# Patient Record
Sex: FEMALE | Race: BLACK OR AFRICAN AMERICAN | Employment: FULL TIME | URBAN - METROPOLITAN AREA
[De-identification: names, ages, dates, MRNs, and addresses within clinical notes are randomized per-mention and may not be internally consistent; named-entity substitution may affect disease eponyms.]

---

## 2024-03-02 ENCOUNTER — APPOINTMENT (OUTPATIENT)
Dept: GENERAL RADIOLOGY | Age: 24
End: 2024-03-02
Attending: EMERGENCY MEDICINE
Payer: MEDICAID

## 2024-03-02 ENCOUNTER — HOSPITAL ENCOUNTER (EMERGENCY)
Age: 24
Discharge: HOME | End: 2024-03-02
Attending: EMERGENCY MEDICINE
Payer: MEDICAID

## 2024-03-02 VITALS
TEMPERATURE: 99 F | HEART RATE: 93 BPM | HEIGHT: 63 IN | WEIGHT: 120 LBS | DIASTOLIC BLOOD PRESSURE: 78 MMHG | RESPIRATION RATE: 20 BRPM | OXYGEN SATURATION: 97 % | BODY MASS INDEX: 21.26 KG/M2 | SYSTOLIC BLOOD PRESSURE: 123 MMHG

## 2024-03-02 DIAGNOSIS — J10.1 INFLUENZA B: Primary | ICD-10-CM

## 2024-03-02 LAB
POCT INFLUENZA A: NEGATIVE
POCT INFLUENZA B: POSITIVE
SARS-COV-2 RNA RESP QL NAA+PROBE: NOT DETECTED

## 2024-03-02 PROCEDURE — 87502 INFLUENZA DNA AMP PROBE: CPT | Performed by: EMERGENCY MEDICINE

## 2024-03-02 PROCEDURE — 99284 EMERGENCY DEPT VISIT MOD MDM: CPT

## 2024-03-02 PROCEDURE — 71045 X-RAY EXAM CHEST 1 VIEW: CPT | Performed by: EMERGENCY MEDICINE

## 2024-03-02 PROCEDURE — 87502 INFLUENZA DNA AMP PROBE: CPT

## 2024-03-02 RX ORDER — IBUPROFEN 600 MG/1
600 TABLET ORAL EVERY 8 HOURS PRN
Qty: 30 TABLET | Refills: 0 | Status: SHIPPED | OUTPATIENT
Start: 2024-03-02 | End: 2024-03-12

## 2024-03-02 RX ORDER — IBUPROFEN 600 MG/1
600 TABLET ORAL ONCE
Status: COMPLETED | OUTPATIENT
Start: 2024-03-02 | End: 2024-03-02

## 2024-03-03 NOTE — ED PROVIDER NOTES
Patient Seen in: [facility] In [place]      History     Chief Complaint   Patient presents with    Cough/URI     Stated Complaint: since wednesday upper back pain, coughing, runny nose, hasnt taken flu or covid*    Subjective:   HPI    Patient is a 23-year-old female who presents Wednesday has been having upper respiratory symptoms with a productive cough low-grade fever and bodyaches.  Did not take any medications for this.  Is concerned may have COVID or influenza.  Does have a history of asthma and states her chest has felt little more tight than usual.  No other complaints.    Objective:   Past Medical History:   Diagnosis Date    Asthma (HCC)               History reviewed. No pertinent surgical history.             Social History     Socioeconomic History    Marital status: Single   Tobacco Use    Smoking status: Former     Types: Cigarettes    Smokeless tobacco: Never   Vaping Use    Vaping Use: Never used   Substance and Sexual Activity    Alcohol use: Yes     Alcohol/week: 0.0 standard drinks of alcohol    Drug use: Yes     Frequency: 1.0 times per week     Types: Cannabis    Sexual activity: Yes     Partners: Male              Review of Systems    Positive for stated complaint: since wednesday upper back pain, coughing, runny nose, hasnt taken flu or covid*  Other systems are as noted in HPI.  Constitutional and vital signs reviewed.      All other systems reviewed and negative except as noted above.    Physical Exam     ED Triage Vitals   BP 03/02/24 1843 123/78   Pulse 03/02/24 1843 93   Resp 03/02/24 1843 20   Temp 03/02/24 1842 98.7 °F (37.1 °C)   Temp src 03/02/24 1842 Temporal   SpO2 03/02/24 1843 97 %   O2 Device 03/02/24 1843 None (Room air)       Current:/78   Pulse 93   Temp 98.7 °F (37.1 °C) (Temporal)   Resp 20   Ht 160 cm (5' 3\")   Wt 54.4 kg   LMP 02/01/2024   SpO2 97%   BMI 21.26 kg/m²         Physical Exam      Vital signs reviewed  General appearance:  Patient is alert and in no acute distress  HEENT: Pupils equal react to light extraocular muscles intact no scleral icterus, mucous membranes are moist, there is no erythema or exudate in the posterior pharynx  Neck: Supple no JVD no lymphadenopathy no meningismus no carotid bruit  CV: Regular rate and rhythm no murmur rub  Respiratory: Clear to auscultation bilaterally no crackles no wheezes no accessory muscle use  Abdomen: Soft nontender nondistended, no rebound no guarding  no hepatosplenomegaly bowel sounds are present , no pulsatile mass  Extremities: No clubbing cyanosis or edema 2+ distal pulses.  Neuro: Cranial nerves II through XII intact with no gross focal sensory or motor abnormality.      ED Course     Labs Reviewed   POCT FLU TEST - Abnormal; Notable for the following components:       Result Value    POCT INFLUENZA B Positive (*)     All other components within normal limits    Narrative:     This assay is a rapid molecular in vitro test utilizing nucleic acid amplification of influenza A and B viral RNA.   RAPID SARS-COV-2 BY PCR - Normal             Physical exam was unremarkable.  She had no wheezing and no crackles or abnormal breath sounds.  A chest x-ray along with a COVID and flu was ordered.  Will await results.  Also will give some ibuprofen.    XR CHEST AP PORTABLE  (CPT=71045)    Result Date: 3/2/2024  CONCLUSION:  No active disease seen.  LOCATION:  [facility]      Dictated by (CST): Shai Thomas MD on 3/02/2024 at 8:18 PM     Finalized by (CST): Shai Thomas MD on 3/02/2024 at 8:19 PM           Patient was positive for influenza B.  Told her this should be self-limiting Motrin Tylenol fluids and plenty of rest.  Patient agrees with plan.    MDM      Differential diagnosis reflecting the complexity of care include: Influenza, COVID, viral URI      My independent interpretation of studies of: Chest x-ray was unremarkable no acute some consolidation or effusion.      Shared decision  making was done by self and patient.  Patient should do supportive care drink plenty of fluids Motrin and Tylenol as needed.  Return if worse.    Patient was screened and evaluated during this visit.  As the treating physician attending to the patient, I determined within reasonable clinical confidence and prior to discharge, that an emergency medical condition was not or was no longer present.  There was no indication for further evaluation, treatment, or admission on an emergency basis.  Comprehensive verbal and written discharge and follow-up instructions were provided to help prevent relapse or worsening.  Patient was instructed to follow-up with primary care provider for further evaluation treatment, return immediately to ER for worsening, concerning, new, or changing/persisting symptoms.  I discussed the case with the patient and they had no questions, complaints, or concerns.  Patient was comfortable going home.      Dictation Disclaimer Note:   To increase efficiency this document may have been prepared using voice recognition technology. Every effort has been made to correct any errors made during preparation of this note. However, if a word or phrase is confusing, or does not make sense, this is likely due to a recognition error within the program which was not discovered during editing. Please do not hesitate to contact to address any significant errors.    Note to Patient:   The 21st Century Cures Act makes medical notes like these available to patients in the interest of transparency. Please be advised this is a medical document. Medical documents are intended to carry relevant information, facts as evident, and the clinical opinion of the practitioner. The medical note is intended as peer to peer communication and may appear blunt or direct. It is written in medical language and may contain abbreviations or verbiage that are unfamiliar.                                          Medical Decision  Making      Disposition and Plan     Clinical Impression:  1. Influenza B         Disposition:  Discharge  3/2/2024  7:37 pm    Follow-up:  Your primary care    Follow up            Medications Prescribed:  Discharge Medication List as of 3/2/2024  8:02 PM        START taking these medications    Details   ibuprofen 600 MG Oral Tab Take 1 tablet (600 mg total) by mouth every 8 (eight) hours as needed for Pain., Normal, Disp-30 tablet, R-0

## 2024-04-12 ENCOUNTER — HOSPITAL ENCOUNTER (EMERGENCY)
Facility: HOSPITAL | Age: 24
Discharge: HOME | End: 2024-04-12
Attending: EMERGENCY MEDICINE
Payer: MEDICAID

## 2024-04-12 ENCOUNTER — APPOINTMENT (OUTPATIENT)
Dept: GENERAL RADIOLOGY | Facility: HOSPITAL | Age: 24
End: 2024-04-12
Attending: EMERGENCY MEDICINE
Payer: MEDICAID

## 2024-04-12 VITALS
WEIGHT: 130 LBS | RESPIRATION RATE: 18 BRPM | TEMPERATURE: 98 F | HEIGHT: 63 IN | BODY MASS INDEX: 23.04 KG/M2 | OXYGEN SATURATION: 97 % | HEART RATE: 88 BPM | DIASTOLIC BLOOD PRESSURE: 67 MMHG | SYSTOLIC BLOOD PRESSURE: 120 MMHG

## 2024-04-12 DIAGNOSIS — J06.9 VIRAL URI WITH COUGH: Primary | ICD-10-CM

## 2024-04-12 LAB
B-HCG UR QL: NEGATIVE
FLUAV + FLUBV RNA SPEC NAA+PROBE: NEGATIVE
FLUAV + FLUBV RNA SPEC NAA+PROBE: NEGATIVE
RSV RNA SPEC NAA+PROBE: NEGATIVE
SARS-COV-2 RNA RESP QL NAA+PROBE: NOT DETECTED

## 2024-04-12 PROCEDURE — 87430 STREP A AG IA: CPT | Performed by: EMERGENCY MEDICINE

## 2024-04-12 PROCEDURE — 99284 EMERGENCY DEPT VISIT MOD MDM: CPT

## 2024-04-12 PROCEDURE — 81025 URINE PREGNANCY TEST: CPT

## 2024-04-12 PROCEDURE — 0241U SARS-COV-2/FLU A AND B/RSV BY PCR (GENEXPERT): CPT

## 2024-04-12 PROCEDURE — 71046 X-RAY EXAM CHEST 2 VIEWS: CPT | Performed by: EMERGENCY MEDICINE

## 2024-04-12 PROCEDURE — 0241U SARS-COV-2/FLU A AND B/RSV BY PCR (GENEXPERT): CPT | Performed by: EMERGENCY MEDICINE

## 2024-04-12 PROCEDURE — 87430 STREP A AG IA: CPT

## 2024-04-12 RX ORDER — ALBUTEROL SULFATE 90 UG/1
2 AEROSOL, METERED RESPIRATORY (INHALATION) EVERY 4 HOURS PRN
Qty: 1 EACH | Refills: 0 | Status: SHIPPED | OUTPATIENT
Start: 2024-04-12 | End: 2024-05-12

## 2024-04-12 RX ORDER — ALBUTEROL SULFATE 90 UG/1
AEROSOL, METERED RESPIRATORY (INHALATION)
Status: DISCONTINUED
Start: 2024-04-12 | End: 2024-04-12 | Stop reason: WASHOUT

## 2024-04-12 NOTE — ED QUICK NOTES
Bedside report received from KATHLEEN Chan. Patient is lab results at this time. Plan of care reviewed with patient. Need preg/ua

## 2024-04-12 NOTE — ED PROVIDER NOTES
Patient Seen in: [facility] Department      History     Chief Complaint   Patient presents with    Sore Throat    Shortness Of Breath     Stated Complaint: pt states throat pain with chest and body pain    Subjective:   HPI    This is a 23-year-old female past medical history of asthma who presents with sore throat and congestion starting this past night.  She denies any history of fever.  She states she has a wet cough.  Sore throat.  Her mother is sick with respiratory symptoms.  No nausea, vomiting or diarrhea.  No abdominal pain.  No urinary symptoms.  She smokes marijuana.  Denies alcohol use.  No illicit drug use.  She states her last menstrual cycle was March 1.  She is not on birth control.  She presents here for further evaluation.                        Objective:   Past Medical History:    Asthma (HCC)              History reviewed. No pertinent surgical history.             Social History     Socioeconomic History    Marital status: Single   Tobacco Use    Smoking status: Former     Types: Cigarettes    Smokeless tobacco: Never   Vaping Use    Vaping status: Never Used   Substance and Sexual Activity    Alcohol use: Yes     Alcohol/week: 0.0 standard drinks of alcohol    Drug use: Yes     Frequency: 1.0 times per week     Types: Cannabis    Sexual activity: Yes     Partners: Male              Review of Systems    Positive for stated complaint: pt states throat pain with chest and body pain  Other systems are as noted in HPI.  Constitutional and vital signs reviewed.      All other systems reviewed and negative except as noted above.    Physical Exam     ED Triage Vitals [04/12/24 0602]   /82   Pulse 96   Resp 18   Temp 97.9 °F (36.6 °C)   Temp src Temporal   SpO2 99 %   O2 Device None (Room air)       Current:/82   Pulse 96   Temp 97.9 °F (36.6 °C) (Temporal)   Resp 18   Ht 160 cm (5' 3\")   Wt 59 kg   LMP 03/01/2024   SpO2 99%   BMI 23.03 kg/m²         Physical  Exam    GENERAL: Awake, alert oriented x3, nontoxic appearing.   SKIN: Normal, warm, and dry.  HEENT:  Pupils equally round and reactive to light. Conjuctiva clear.  TMs clear and intact bilaterally.  Oropharynx is clear and moist.  Cervical of adenopathy.  Lungs: Clear to auscultation bilaterally with no rales, no retractions, and no wheezing.  HEART:  Regular rate and rhythm. S1 and S2. No murmurs, no rubs or gallops.   ABDOMEN: Soft, nontender and nondistended. Normoactive bowel sounds. No rebound. No guarding.   EXTREMITIES: Warm with brisk capillary refill.         ED Course     Labs Reviewed   POCT PREGNANCY URINE - Normal   SARS-COV-2/FLU A AND B/RSV BY PCR ([facility]) - Normal    Narrative:     This test is intended for the qualitative detection and differentiation of SARS-CoV-2, influenza A, influenza B, and respiratory syncytial virus (RSV) viral RNA in nasopharyngeal or nares swabs from individuals suspected of respiratory viral infection consistent with COVID-19 by their healthcare provider. Signs and symptoms of respiratory viral infection due to SARS-CoV-2, influenza, and RSV can be similar.    Test performed using the Xpert Xpress SARS-CoV-2/FLU/RSV (real time RT-PCR)  assay on the GeneXpert instrument, [employer], [place] [address].   This test is being used under the Food and Drug Administration's Emergency Use Authorization.    The authorized Fact Sheet for Healthcare Providers for this assay is available upon request from the laboratory.   RAPID STREP A SCREEN ([facility]) - Normal    Narrative:     A confirmatory culture is recommended if clinically indicated.             XR CHEST PA + LAT CHEST (CPT=71046)    Result Date: 4/12/2024  PROCEDURE:  XR CHEST PA + LAT CHEST (CPT=71046)  INDICATIONS:  pt states throat pain with chest and body pain  COMPARISON:  [facility], XR, XR CHEST AP PORTABLE  (CPT=71045), 3/02/2024, 7:25 PM.  TECHNIQUE:  PA and lateral chest radiographs were obtained.  PATIENT STATED  HISTORY: (As transcribed by Technologist)  Patient shares she has a history of asthma and woke up this morning with congeston and difficulty breathing.    FINDINGS:  Cardiac silhouette and pulmonary vasculature within normal limits. No focal consolidation, pneumothorax or pleural effusion.            CONCLUSION:  No focal consolidation.   LOCATION:  [facility]   Dictated by (CST): Roro Montoya MD on 4/12/2024 at 7:38 AM     Finalized by (CST): Roro Montoya MD on 4/12/2024 at 7:39 AM              MDM     This is a 23-year-old female past medical history of asthma who presents with sore throat and congestion starting this past night.  Differential includes strep throat, viral pharyngitis, COVID.      Flu/COVID/RSV negative    Rapid strep: Negative    Pregnancy test negative        I dependently viewed the chest x-ray showed no evidence pneumonia or consolidation.  Also reviewed the radiology interpretation and in agreement.      Findings consistent with viral URI.  Patient has a history of underlying asthma.  Requesting inhaler refill.  She was given a spacer and albuterol was refilled.  Treat symptomatically.  Return if worse.  Follow-up with your primary care 3 to 5 days.  Patient discharged home in good condition.          Disposition and Plan     Clinical Impression:  1. Viral URI with cough         Disposition:  Discharge  4/12/2024  7:42 am    Follow-up:  Daniela Duque MD  [facility] [id number] [phone]    Follow up on 4/15/2024            Medications Prescribed:  Current Discharge Medication List        START taking these medications    Details   albuterol 108 (90 Base) MCG/ACT Inhalation Aero Soln Inhale 2 puffs into the lungs every 4 (four) hours as needed for Wheezing.  Qty: 1 each, Refills: 0

## 2024-04-12 NOTE — DISCHARGE INSTRUCTIONS
Use your albuterol inhaler with spacer as needed  Tylenol for pain or fever  Rest  Drink plenty of fluids  Throat lozenges  Return if worse  Recheck with your primary care physician on Monday